# Patient Record
Sex: FEMALE | Race: WHITE | NOT HISPANIC OR LATINO | Employment: UNEMPLOYED | URBAN - METROPOLITAN AREA
[De-identification: names, ages, dates, MRNs, and addresses within clinical notes are randomized per-mention and may not be internally consistent; named-entity substitution may affect disease eponyms.]

---

## 2022-05-20 ENCOUNTER — TELEPHONE (OUTPATIENT)
Dept: URGENT CARE | Facility: CLINIC | Age: 52
End: 2022-05-20

## 2024-07-25 ENCOUNTER — HOSPITAL ENCOUNTER (EMERGENCY)
Facility: HOSPITAL | Age: 54
Discharge: HOME | End: 2024-07-25
Attending: EMERGENCY MEDICINE
Payer: COMMERCIAL

## 2024-07-25 ENCOUNTER — APPOINTMENT (OUTPATIENT)
Dept: CT IMAGING | Facility: HOSPITAL | Age: 54
End: 2024-07-25
Payer: COMMERCIAL

## 2024-07-25 VITALS
RESPIRATION RATE: 16 BRPM | HEART RATE: 57 BPM | OXYGEN SATURATION: 100 % | TEMPERATURE: 97.6 F | WEIGHT: 175 LBS | DIASTOLIC BLOOD PRESSURE: 68 MMHG | BODY MASS INDEX: 28.12 KG/M2 | SYSTOLIC BLOOD PRESSURE: 114 MMHG | HEIGHT: 66 IN

## 2024-07-25 DIAGNOSIS — M51.36 LUMBAR DEGENERATIVE DISC DISEASE: Primary | ICD-10-CM

## 2024-07-25 LAB
ALBUMIN SERPL-MCNC: 4.2 G/DL (ref 3.5–5.2)
ALBUMIN/GLOB SERPL: 1.6 G/DL
ALP SERPL-CCNC: 90 U/L (ref 39–117)
ALT SERPL W P-5'-P-CCNC: 14 U/L (ref 1–33)
ANION GAP SERPL CALCULATED.3IONS-SCNC: 6.8 MMOL/L (ref 5–15)
AST SERPL-CCNC: 16 U/L (ref 1–32)
BACTERIA UR QL AUTO: ABNORMAL /HPF
BASOPHILS # BLD AUTO: 0.05 10*3/MM3 (ref 0–0.2)
BASOPHILS NFR BLD AUTO: 0.9 % (ref 0–1.5)
BILIRUB SERPL-MCNC: 0.5 MG/DL (ref 0–1.2)
BILIRUB UR QL STRIP: NEGATIVE
BUN SERPL-MCNC: 21 MG/DL (ref 6–20)
BUN/CREAT SERPL: 24.7 (ref 7–25)
CALCIUM SPEC-SCNC: 9.2 MG/DL (ref 8.6–10.5)
CHLORIDE SERPL-SCNC: 106 MMOL/L (ref 98–107)
CLARITY UR: CLEAR
CO2 SERPL-SCNC: 27.2 MMOL/L (ref 22–29)
COLOR UR: YELLOW
CREAT SERPL-MCNC: 0.85 MG/DL (ref 0.57–1)
DEPRECATED RDW RBC AUTO: 43.3 FL (ref 37–54)
EGFRCR SERPLBLD CKD-EPI 2021: 80.5 ML/MIN/1.73
EOSINOPHIL # BLD AUTO: 0.17 10*3/MM3 (ref 0–0.4)
EOSINOPHIL NFR BLD AUTO: 3.2 % (ref 0.3–6.2)
ERYTHROCYTE [DISTWIDTH] IN BLOOD BY AUTOMATED COUNT: 12.7 % (ref 12.3–15.4)
GLOBULIN UR ELPH-MCNC: 2.6 GM/DL
GLUCOSE SERPL-MCNC: 98 MG/DL (ref 65–99)
GLUCOSE UR STRIP-MCNC: NEGATIVE MG/DL
HCT VFR BLD AUTO: 39 % (ref 34–46.6)
HGB BLD-MCNC: 12.5 G/DL (ref 12–15.9)
HGB UR QL STRIP.AUTO: ABNORMAL
HOLD SPECIMEN: NORMAL
HYALINE CASTS UR QL AUTO: ABNORMAL /LPF
IMM GRANULOCYTES # BLD AUTO: 0.01 10*3/MM3 (ref 0–0.05)
IMM GRANULOCYTES NFR BLD AUTO: 0.2 % (ref 0–0.5)
KETONES UR QL STRIP: NEGATIVE
LEUKOCYTE ESTERASE UR QL STRIP.AUTO: NEGATIVE
LYMPHOCYTES # BLD AUTO: 1.63 10*3/MM3 (ref 0.7–3.1)
LYMPHOCYTES NFR BLD AUTO: 30.4 % (ref 19.6–45.3)
MAGNESIUM SERPL-MCNC: 2.2 MG/DL (ref 1.6–2.6)
MCH RBC QN AUTO: 29.8 PG (ref 26.6–33)
MCHC RBC AUTO-ENTMCNC: 32.1 G/DL (ref 31.5–35.7)
MCV RBC AUTO: 92.9 FL (ref 79–97)
MONOCYTES # BLD AUTO: 0.43 10*3/MM3 (ref 0.1–0.9)
MONOCYTES NFR BLD AUTO: 8 % (ref 5–12)
NEUTROPHILS NFR BLD AUTO: 3.07 10*3/MM3 (ref 1.7–7)
NEUTROPHILS NFR BLD AUTO: 57.3 % (ref 42.7–76)
NITRITE UR QL STRIP: NEGATIVE
PH UR STRIP.AUTO: 6 [PH] (ref 5–8)
PLATELET # BLD AUTO: 206 10*3/MM3 (ref 140–450)
PMV BLD AUTO: 9.2 FL (ref 6–12)
POTASSIUM SERPL-SCNC: 3.8 MMOL/L (ref 3.5–5.2)
PROT SERPL-MCNC: 6.8 G/DL (ref 6–8.5)
PROT UR QL STRIP: NEGATIVE
RBC # BLD AUTO: 4.2 10*6/MM3 (ref 3.77–5.28)
RBC # UR STRIP: ABNORMAL /HPF
REF LAB TEST METHOD: ABNORMAL
SODIUM SERPL-SCNC: 140 MMOL/L (ref 136–145)
SP GR UR STRIP: 1.01 (ref 1–1.03)
SQUAMOUS #/AREA URNS HPF: ABNORMAL /HPF
UROBILINOGEN UR QL STRIP: ABNORMAL
WBC # UR STRIP: ABNORMAL /HPF
WBC NRBC COR # BLD AUTO: 5.36 10*3/MM3 (ref 3.4–10.8)

## 2024-07-25 PROCEDURE — 99284 EMERGENCY DEPT VISIT MOD MDM: CPT

## 2024-07-25 PROCEDURE — 96375 TX/PRO/DX INJ NEW DRUG ADDON: CPT

## 2024-07-25 PROCEDURE — 96374 THER/PROPH/DIAG INJ IV PUSH: CPT

## 2024-07-25 PROCEDURE — 85025 COMPLETE CBC W/AUTO DIFF WBC: CPT | Performed by: EMERGENCY MEDICINE

## 2024-07-25 PROCEDURE — 96361 HYDRATE IV INFUSION ADD-ON: CPT

## 2024-07-25 PROCEDURE — 99284 EMERGENCY DEPT VISIT MOD MDM: CPT | Performed by: EMERGENCY MEDICINE

## 2024-07-25 PROCEDURE — 83735 ASSAY OF MAGNESIUM: CPT | Performed by: EMERGENCY MEDICINE

## 2024-07-25 PROCEDURE — 80053 COMPREHEN METABOLIC PANEL: CPT | Performed by: EMERGENCY MEDICINE

## 2024-07-25 PROCEDURE — 25810000003 SODIUM CHLORIDE 0.9 % SOLUTION: Performed by: EMERGENCY MEDICINE

## 2024-07-25 PROCEDURE — 25010000002 ONDANSETRON PER 1 MG: Performed by: EMERGENCY MEDICINE

## 2024-07-25 PROCEDURE — 81001 URINALYSIS AUTO W/SCOPE: CPT | Performed by: EMERGENCY MEDICINE

## 2024-07-25 PROCEDURE — 25010000002 KETOROLAC TROMETHAMINE PER 15 MG: Performed by: EMERGENCY MEDICINE

## 2024-07-25 PROCEDURE — 25010000002 METHYLPREDNISOLONE PER 125 MG: Performed by: EMERGENCY MEDICINE

## 2024-07-25 PROCEDURE — 74176 CT ABD & PELVIS W/O CONTRAST: CPT

## 2024-07-25 RX ORDER — KETOROLAC TROMETHAMINE 30 MG/ML
30 INJECTION, SOLUTION INTRAMUSCULAR; INTRAVENOUS ONCE
Status: COMPLETED | OUTPATIENT
Start: 2024-07-25 | End: 2024-07-25

## 2024-07-25 RX ORDER — PREDNISONE 20 MG/1
TABLET ORAL
Qty: 12 TABLET | Refills: 0 | Status: SHIPPED | OUTPATIENT
Start: 2024-07-25

## 2024-07-25 RX ORDER — ONDANSETRON 2 MG/ML
4 INJECTION INTRAMUSCULAR; INTRAVENOUS ONCE
Status: COMPLETED | OUTPATIENT
Start: 2024-07-25 | End: 2024-07-25

## 2024-07-25 RX ORDER — CYCLOBENZAPRINE HCL 10 MG
10 TABLET ORAL 3 TIMES DAILY PRN
Qty: 21 TABLET | Refills: 0 | Status: SHIPPED | OUTPATIENT
Start: 2024-07-25 | End: 2024-08-01

## 2024-07-25 RX ORDER — HYDROCODONE BITARTRATE AND ACETAMINOPHEN 5; 325 MG/1; MG/1
1 TABLET ORAL EVERY 6 HOURS PRN
Qty: 12 TABLET | Refills: 0 | Status: SHIPPED | OUTPATIENT
Start: 2024-07-25 | End: 2024-07-28

## 2024-07-25 RX ORDER — METHYLPREDNISOLONE SODIUM SUCCINATE 125 MG/2ML
125 INJECTION, POWDER, LYOPHILIZED, FOR SOLUTION INTRAMUSCULAR; INTRAVENOUS ONCE
Status: COMPLETED | OUTPATIENT
Start: 2024-07-25 | End: 2024-07-25

## 2024-07-25 RX ADMIN — ONDANSETRON 4 MG: 2 INJECTION INTRAMUSCULAR; INTRAVENOUS at 09:15

## 2024-07-25 RX ADMIN — KETOROLAC TROMETHAMINE 30 MG: 30 INJECTION, SOLUTION INTRAMUSCULAR at 09:15

## 2024-07-25 RX ADMIN — METHYLPREDNISOLONE SODIUM SUCCINATE 125 MG: 125 INJECTION INTRAMUSCULAR; INTRAVENOUS at 10:44

## 2024-07-25 RX ADMIN — SODIUM CHLORIDE 500 ML: 9 INJECTION, SOLUTION INTRAVENOUS at 09:14

## 2024-07-25 NOTE — DISCHARGE INSTRUCTIONS
Today on the CAT scan, we do not see any evidence of a kidney stone or any other obstructive uropathy.  Likewise the remainder of the abdomen and pelvis is normal.  On your lumbosacral spine however you do have degenerative disc disease.  There is narrowing at the right lateral recess of L5-S1 which does appear to contact of the right S1 nerve root.  This does supply the upper leg area and may very well be the cause of your discomfort.    I did send in a prescription for some pain control and muscle relaxer, and a short course of steroids.    I would like you to touch base with your primary care within 7 to 10 days.  If you have persistent pain we may need to consider an MRI of your lumbosacral spine to better assess this area    You likewise did have trace blood on the urine sample.  I would like you to repeat a urine sample in 1 to 2 weeks to make sure the blood has completely cleared    Return for any worsening difficulties    Please read all of the instructions in this handout.  If you receive prescriptions please fill them and take them as directed.  Please call your primary care physician for follow-up appointment in the next 5 to 7 days.  If you do not have a physician you may call the [facility] referral line at [phone].    You may return to the emergency department at any time for any concerns such as worsening symptoms.  If you received a work or school note it will be printed at the back of this packet.

## 2024-07-25 NOTE — FSED PROVIDER NOTE
EMERGENCY DEPARTMENT ENCOUNTER    Room Number:  01/01  Date seen:  7/25/2024  Time seen: 08:53 EDT  PCP: Provider, No Known  Historian:     Discussed/obtained information from independent historians:     HPI:    A complete HPI/ROS/PMH/PSH/SH/FH are unobtainable due to:       History:  Patient is a 54-year-old female.  She presents with a 2-day history of intermittent pain from the right flank into the right lower quadrant.  The pain severely worsened this morning.  It is quite sharp in nature.  No nausea vomiting.  No guy hematuria.  She does report that she was treated for UTI approximately 2 weeks ago.  She states that she did have similar discomfort approximately 1.5 years ago.  She went to the hospital and had complete relief of the discomfort after urinating.  She reports that ultimately the CAT scan did not reveal a ureteral stone.  No other abdominal surgeries    External (non-ED) record review:        Chronic or social conditions impacting care:    ALLERGIES  Sulfa antibiotics    PAST MEDICAL HISTORY  Active Ambulatory Problems     Diagnosis Date Noted    No Active Ambulatory Problems     Resolved Ambulatory Problems     Diagnosis Date Noted    No Resolved Ambulatory Problems     No Additional Past Medical History       PAST SURGICAL HISTORY  History reviewed. No pertinent surgical history.    FAMILY HISTORY  History reviewed. No pertinent family history.    SOCIAL HISTORY  Social History     Socioeconomic History    Marital status:    Tobacco Use    Smoking status: Never   Substance and Sexual Activity    Alcohol use: Not Currently       REVIEW OF SYSTEMS  Review of Systems    All systems reviewed and negative except for those discussed in HPI.       PHYSICAL EXAM    I have reviewed the triage vital signs and nursing notes.  Vitals:    07/25/24 0840   BP: 114/68   Pulse: 57   Resp: 16   Temp: 97.6 °F (36.4 °C)   SpO2: 100%     Physical Exam  Vital signs: Reviewed in nurses notes    General:  Patient is awake alert.  She does appear to be uncomfortable    HEENT: Pupils equal round responsive to light.  Extra-ocular movements are intact.  No scleral icterus.  Nasopharynx is clear.  Oropharynx is clear with moist mucous membranes.  No masses noted    Neck:   Supple without lymphadenopathy    Respiratory:   Nonlabored respirations.  Clear to auscultation bilaterally.  Equal breath sounds bilaterally.  No wheezes or stridor noted.    Cardiovascular: Regular rate and rhythm.  No murmur.  Equal pulses in bilateral lower extremities without edema.    Abdomen: Very soft nondistended.  Mild tenderness right adnexal and right lower quadrant.  No guarding or rebound.    Back: Mild right CVA tenderness.  There is also some point tenderness in the midline in the L3-4-5 area as well as in the right paraspinal region in the same 345 area    Skin:   Warm and dry.  No rashes noted    Neurological examination: Patient is awake alert oriented x4.  Speech is normal.  No facial palsy.  No focal motor or sensory deficits.  LAB RESULTS  Recent Results (from the past 24 hour(s))   Urinalysis With Culture If Indicated - Urine, Clean Catch    Collection Time: 07/25/24  8:43 AM    Specimen: Urine, Clean Catch   Result Value Ref Range    Color, UA Yellow Yellow, Straw    Appearance, UA Clear Clear    pH, UA 6.0 5.0 - 8.0    Specific Gravity, UA 1.015 1.005 - 1.030    Glucose, UA Negative Negative    Ketones, UA Negative Negative    Bilirubin, UA Negative Negative    Blood, UA Small (1+) (A) Negative    Protein, UA Negative Negative    Leuk Esterase, UA Negative Negative    Nitrite, UA Negative Negative    Urobilinogen, UA 0.2 E.U./dL 0.2 - 1.0 E.U./dL   Comprehensive Metabolic Panel    Collection Time: 07/25/24  9:14 AM    Specimen: Blood   Result Value Ref Range    Glucose 98 65 - 99 mg/dL    BUN 21 (H) 6 - 20 mg/dL    Creatinine 0.85 0.57 - 1.00 mg/dL    Sodium 140 136 - 145 mmol/L    Potassium 3.8 3.5 - 5.2 mmol/L    Chloride  106 98 - 107 mmol/L    CO2 27.2 22.0 - 29.0 mmol/L    Calcium 9.2 8.6 - 10.5 mg/dL    Total Protein 6.8 6.0 - 8.5 g/dL    Albumin 4.2 3.5 - 5.2 g/dL    ALT (SGPT) 14 1 - 33 U/L    AST (SGOT) 16 1 - 32 U/L    Alkaline Phosphatase 90 39 - 117 U/L    Total Bilirubin 0.5 0.0 - 1.2 mg/dL    Globulin 2.6 gm/dL    A/G Ratio 1.6 g/dL    BUN/Creatinine Ratio 24.7 7.0 - 25.0    Anion Gap 6.8 5.0 - 15.0 mmol/L    eGFR 80.5 >60.0 mL/min/1.73   Magnesium    Collection Time: 07/25/24  9:14 AM    Specimen: Blood   Result Value Ref Range    Magnesium 2.2 1.6 - 2.6 mg/dL   CBC Auto Differential    Collection Time: 07/25/24  9:14 AM    Specimen: Blood   Result Value Ref Range    WBC 5.36 3.40 - 10.80 10*3/mm3    RBC 4.20 3.77 - 5.28 10*6/mm3    Hemoglobin 12.5 12.0 - 15.9 g/dL    Hematocrit 39.0 34.0 - 46.6 %    MCV 92.9 79.0 - 97.0 fL    MCH 29.8 26.6 - 33.0 pg    MCHC 32.1 31.5 - 35.7 g/dL    RDW 12.7 12.3 - 15.4 %    RDW-SD 43.3 37.0 - 54.0 fl    MPV 9.2 6.0 - 12.0 fL    Platelets 206 140 - 450 10*3/mm3    Neutrophil % 57.3 42.7 - 76.0 %    Lymphocyte % 30.4 19.6 - 45.3 %    Monocyte % 8.0 5.0 - 12.0 %    Eosinophil % 3.2 0.3 - 6.2 %    Basophil % 0.9 0.0 - 1.5 %    Immature Grans % 0.2 0.0 - 0.5 %    Neutrophils, Absolute 3.07 1.70 - 7.00 10*3/mm3    Lymphocytes, Absolute 1.63 0.70 - 3.10 10*3/mm3    Monocytes, Absolute 0.43 0.10 - 0.90 10*3/mm3    Eosinophils, Absolute 0.17 0.00 - 0.40 10*3/mm3    Basophils, Absolute 0.05 0.00 - 0.20 10*3/mm3    Immature Grans, Absolute 0.01 0.00 - 0.05 10*3/mm3       Ordered the above labs and independently interpreted results.  My findings will be discussed in the ED course or medical decision making section below      PROCEDURES:  Procedures      RADIOLOGY RESULTS  CT Abdomen Pelvis Stone Protocol    Result Date: 7/25/2024  CT ABDOMEN PELVIS STONE PROTOCOL-  HISTORY: 54 years of age, Female. Right flank pain.  TECHNIQUE:  CT includes axial imaging from the lung bases to the trochanters  without intravenous contrast and without use of oral contrast. Data reconstructed in coronal and sagittal planes. Radiation dose reduction techniques were utilized, including automated exposure control and exposure modulation based on body size.  COMPARISON: None.  FINDINGS: Lung bases, liver, spleen, adrenal glands, pancreas, gallbladder exhibit normal noncontrasted CT appearance.  No renal or ureteral stone or hydronephrosis. Urinary bladder partially decompressed.  No bowel dilatation or evidence for bowel obstruction. No evidence for appendicitis. No ascites. Degenerative disc disease at L5-S1 where there is disc space narrowing and there is disc osteophyte complex eccentric to the right near the right lateral recess and potentially contacting the traversing right S1 nerve.      1. No renal stone or evidence for obstructive uropathy or acute abnormality of the abdomen/pelvis. 2. At L5-S1, there is degenerative disc disease and there is disc/osteophyte complex eccentric to the right narrowing the right lateral recess, potentially contacting the traversing right S1 nerve.  Radiation dose reduction techniques were utilized, including automated exposure control and exposure modulation based on body size.   This report was finalized on 7/25/2024 9:39 AM by Dr. Sergey العراقي M.D on Workstation: [id number]        Ordered the above noted radiological studies.  Independently interpreted by me.  My findings will be discussed in the medical decision section below.     PROGRESS, DATA ANALYSIS, CONSULTS AND MEDICAL DECISION MAKING    Please note that this section constitutes my independent interpretation of clinical data including lab results, radiology, EKG's.  This constitutes my independent professional opinion regarding differential diagnosis and management of this patient.  It may include any factors such as history from outside sources, review of external records, social determinants of health, management of  medications, response to those treatments, and discussions with other providers.       Orders placed during this visit:  Orders Placed This Encounter   Procedures    CT Abdomen Pelvis Stone Protocol    Urinalysis With Culture If Indicated - Urine, Clean Catch    Urinalysis, Microscopic Only - Urine, Clean Catch    [facility] Urine Culture Tube -    Comprehensive Metabolic Panel    Magnesium    CBC Auto Differential    CBC & Differential    ED Acknowledgement Form Needed;       Medications   sodium chloride 0.9 % bolus 500 mL (0 mL Intravenous Stopped 7/25/24 1044)   ketorolac (TORADOL) injection 30 mg (30 mg Intravenous Given 7/25/24 0915)   ondansetron (ZOFRAN) injection 4 mg (4 mg Intravenous Given 7/25/24 0915)   methylPREDNISolone sodium succinate (SOLU-Medrol) injection 125 mg (125 mg Intravenous Given 7/25/24 1044)         Repeat exam prior to discharge: I did reexamine the patient.  Again she is tender in the right lower back and paraspinal region.  Her abdominal CAT scan is clear.  I suspect her pain is a radiculopathy and we did discuss appropriate follow-up and treatment plan.       Medical Decision Making  Problems Addressed:  Lumbar degenerative disc disease: complicated acute illness or injury    Amount and/or Complexity of Data Reviewed  Labs: ordered.  Radiology: ordered.    Risk  Prescription drug management.            DIAGNOSIS  Final diagnoses:   Lumbar degenerative disc disease          Medication List        New Prescriptions      cyclobenzaprine 10 MG tablet  Commonly known as: FLEXERIL  Take 1 tablet by mouth 3 (Three) Times a Day As Needed for Muscle Spasms for up to 7 days.     HYDROcodone-acetaminophen 5-325 MG per tablet  Commonly known as: NORCO  Take 1 tablet by mouth Every 6 (Six) Hours As Needed for Moderate Pain for up to 3 days.     predniSONE 20 MG tablet  Commonly known as: DELTASONE  3 po daily x 2d, then 2 po daily x 2d, then 1 po daily x 2d.               Where to Get Your  Medications        These medications were sent to [facility] [id number] - [place], [place] - [address] AT [facility] - [phone]  - [phone]   [address] [address]      Phone: [phone]   cyclobenzaprine 10 MG tablet  HYDROcodone-acetaminophen 5-325 MG per tablet  predniSONE 20 MG tablet         FOLLOW-UP  No follow-up provider specified.      Latest Documented Vital Signs:  As of 10:55 EDT  BP- 114/68 HR- 57 Temp- 97.6 °F (36.4 °C) (Oral) O2 sat- 100%    Appropriate PPE utilized throughout this patient encounter to include mask, if indicated, per current protocol. Hand hygiene was performed before donning PPE and after removal when leaving the room.    Please note that portions of this were completed with a voice recognition program.     Note Disclaimer: At [facility], we believe that sharing information builds trust and better relationships. You are receiving this note because you are receiving care at [facility] or recently visited. It is possible you will see health information before a provider has talked with you about it. This kind of information can be easy to misunderstand. To help you fully understand what it means for your health, we urge you to discuss this note with your provider.

## 2024-12-05 ENCOUNTER — TELEPHONE (OUTPATIENT)
Dept: FAMILY MEDICINE CLINIC | Facility: CLINIC | Age: 54
End: 2024-12-05
Payer: COMMERCIAL

## 2024-12-05 NOTE — TELEPHONE ENCOUNTER
Caller: Leidy Lindquist    Relationship: Self    Best call back number: [phone]     What was the call regarding: PATIENT CALLING WANTING TO KNOW IF  WOULD BE WILLING TO TAKE HER ON AS A PATIENT SHE STATED THAT HER  IS A CURRENT PATIENT JONATHAN LINDQUIST. SHE STATED THAT SHE WAS DIAGNOSED WITH DISC DEGENERATIVE DISEASE EARLY ON IN THE YEAR SHE WOULD LIKE TO BE REFERRED TO DR.STEVEN LE FOR FURTHER EVALUATION.

## 2025-01-02 ENCOUNTER — OFFICE VISIT (OUTPATIENT)
Dept: FAMILY MEDICINE CLINIC | Facility: CLINIC | Age: 55
End: 2025-01-02
Payer: COMMERCIAL

## 2025-01-02 VITALS
BODY MASS INDEX: 28.19 KG/M2 | WEIGHT: 175.4 LBS | SYSTOLIC BLOOD PRESSURE: 118 MMHG | DIASTOLIC BLOOD PRESSURE: 70 MMHG | OXYGEN SATURATION: 98 % | HEART RATE: 69 BPM | TEMPERATURE: 97.9 F | RESPIRATION RATE: 16 BRPM | HEIGHT: 66 IN

## 2025-01-02 DIAGNOSIS — M51.362 DEGENERATION OF INTERVERTEBRAL DISC OF LUMBAR REGION WITH DISCOGENIC BACK PAIN AND LOWER EXTREMITY PAIN: Primary | ICD-10-CM

## 2025-01-02 DIAGNOSIS — Z12.11 SCREEN FOR COLON CANCER: ICD-10-CM

## 2025-01-02 DIAGNOSIS — Z13.6 SCREENING FOR CARDIOVASCULAR CONDITION: ICD-10-CM

## 2025-01-02 DIAGNOSIS — R53.83 OTHER FATIGUE: ICD-10-CM

## 2025-01-02 DIAGNOSIS — Z83.49 FAMILY HISTORY OF MTHFR DEFICIENCY: ICD-10-CM

## 2025-01-02 DIAGNOSIS — E55.9 VITAMIN D DEFICIENCY: ICD-10-CM

## 2025-01-02 PROCEDURE — 99214 OFFICE O/P EST MOD 30 MIN: CPT | Performed by: FAMILY MEDICINE

## 2025-01-02 NOTE — PROGRESS NOTES
"Chief Complaint  Establish Care    Subjective        Leidy Rojas presents to [facility]  History of Present Illness  New pt here to get established.  She has not seen any doctor in several years.  She does have some low back pain and DDD.  She has had CT scan and showed L5-S1 issues.  With some sciatica down both legs.  No urine incontinence.  Possible slight stool incontinence 2 x over past couple of weeks. Small amount of yellow liquid.    She does have fatigue as well  Vit d deficiency- no med.    Objective   Vital Signs:  /70 (BP Location: Right arm, Patient Position: Sitting)   Pulse 69   Temp 97.9 °F (36.6 °C)   Resp 16   Ht 167.6 cm (66\")   Wt 79.6 kg (175 lb 6.4 oz)   SpO2 98%   BMI 28.31 kg/m²   Estimated body mass index is 28.31 kg/m² as calculated from the following:    Height as of this encounter: 167.6 cm (66\").    Weight as of this encounter: 79.6 kg (175 lb 6.4 oz).            Physical Exam  Vitals and nursing note reviewed.   Constitutional:       Appearance: Normal appearance. She is well-developed.   Cardiovascular:      Rate and Rhythm: Normal rate and regular rhythm.      Heart sounds: Normal heart sounds. No murmur heard.  Pulmonary:      Effort: Pulmonary effort is normal. No respiratory distress.      Breath sounds: Normal breath sounds. No stridor. No wheezing or rhonchi.   Neurological:      General: No focal deficit present.      Mental Status: She is alert and oriented to person, place, and time. She is not disoriented.   Psychiatric:         Mood and Affect: Mood normal.         Behavior: Behavior normal.        Result Review :                Assessment and Plan   Diagnoses and all orders for this visit:    1. Degeneration of intervertebral disc of lumbar region with discogenic back pain and lower extremity pain (Primary)  -     Cancel: MRI Lumbar Spine Without Contrast  -     Ambulatory Referral to Spine Surgery  -     MRI Lumbar Spine Without " Contrast    2. Screen for colon cancer  -     Cologuard - Stool, Per Rectum; Future    3. Other fatigue  -     Cancel: CBC & Differential  -     Cancel: TSH Rfx On Abnormal To Free T4  -     Cancel: Vitamin B12  -     CBC & Differential  -     TSH Rfx On Abnormal To Free T4  -     Vitamin B12    4. Screening for cardiovascular condition  -     Cancel: Comprehensive Metabolic Panel  -     Cancel: Lipid Panel  -     Comprehensive Metabolic Panel  -     Lipid Panel    5. Vitamin D deficiency  -     Cancel: Vitamin D,25-Hydroxy  -     Vitamin D,25-Hydroxy  -     Folate    6. Family history of MTHFR deficiency  -     MTHFR Mutation             Follow Up   No follow-ups on file.  Patient was given instructions and counseling regarding her condition or for health maintenance advice. Please see specific information pulled into the AVS if appropriate.       Will get cologard scheduled.    Will get mri and spine specialist.    She will think about mammogram.

## 2025-01-07 DIAGNOSIS — E55.9 VITAMIN D DEFICIENCY: Primary | ICD-10-CM

## 2025-01-07 PROBLEM — E78.2 MIXED HYPERLIPIDEMIA: Status: ACTIVE | Noted: 2025-01-07

## 2025-01-07 LAB
25(OH)D3+25(OH)D2 SERPL-MCNC: 25.8 NG/ML (ref 30–100)
ALBUMIN SERPL-MCNC: 4.2 G/DL (ref 3.8–4.9)
ALP SERPL-CCNC: 98 IU/L (ref 44–121)
ALT SERPL-CCNC: 13 IU/L (ref 0–32)
AST SERPL-CCNC: 16 IU/L (ref 0–40)
BASOPHILS # BLD AUTO: 0.1 X10E3/UL (ref 0–0.2)
BASOPHILS NFR BLD AUTO: 1 %
BILIRUB SERPL-MCNC: 0.6 MG/DL (ref 0–1.2)
BUN SERPL-MCNC: 13 MG/DL (ref 6–24)
BUN/CREAT SERPL: 14 (ref 9–23)
CALCIUM SERPL-MCNC: 9.2 MG/DL (ref 8.7–10.2)
CHLORIDE SERPL-SCNC: 107 MMOL/L (ref 96–106)
CHOLEST SERPL-MCNC: 228 MG/DL (ref 100–199)
CO2 SERPL-SCNC: 24 MMOL/L (ref 20–29)
CREAT SERPL-MCNC: 0.93 MG/DL (ref 0.57–1)
EGFRCR SERPLBLD CKD-EPI 2021: 72 ML/MIN/1.73
EOSINOPHIL # BLD AUTO: 0.3 X10E3/UL (ref 0–0.4)
EOSINOPHIL NFR BLD AUTO: 6 %
ERYTHROCYTE [DISTWIDTH] IN BLOOD BY AUTOMATED COUNT: 12.5 % (ref 11.7–15.4)
FOLATE SERPL-MCNC: 11.4 NG/ML
GLOBULIN SER CALC-MCNC: 2.4 G/DL (ref 1.5–4.5)
GLUCOSE SERPL-MCNC: 88 MG/DL (ref 70–99)
HCT VFR BLD AUTO: 39.4 % (ref 34–46.6)
HDLC SERPL-MCNC: 65 MG/DL
HGB BLD-MCNC: 13 G/DL (ref 11.1–15.9)
IMM GRANULOCYTES # BLD AUTO: 0 X10E3/UL (ref 0–0.1)
IMM GRANULOCYTES NFR BLD AUTO: 0 %
IMP & REVIEW OF LAB RESULTS: NORMAL
LDLC SERPL CALC-MCNC: 144 MG/DL (ref 0–99)
LYMPHOCYTES # BLD AUTO: 1.4 X10E3/UL (ref 0.7–3.1)
LYMPHOCYTES NFR BLD AUTO: 28 %
MCH RBC QN AUTO: 30.4 PG (ref 26.6–33)
MCHC RBC AUTO-ENTMCNC: 33 G/DL (ref 31.5–35.7)
MCV RBC AUTO: 92 FL (ref 79–97)
MONOCYTES # BLD AUTO: 0.4 X10E3/UL (ref 0.1–0.9)
MONOCYTES NFR BLD AUTO: 9 %
MTHFR GENE MUT ANL BLD/T: NORMAL
NEUTROPHILS # BLD AUTO: 2.7 X10E3/UL (ref 1.4–7)
NEUTROPHILS NFR BLD AUTO: 56 %
PLATELET # BLD AUTO: 229 X10E3/UL (ref 150–450)
POTASSIUM SERPL-SCNC: 4.2 MMOL/L (ref 3.5–5.2)
PROT SERPL-MCNC: 6.6 G/DL (ref 6–8.5)
RBC # BLD AUTO: 4.27 X10E6/UL (ref 3.77–5.28)
SODIUM SERPL-SCNC: 143 MMOL/L (ref 134–144)
TRIGL SERPL-MCNC: 106 MG/DL (ref 0–149)
TSH SERPL DL<=0.005 MIU/L-ACNC: 1.18 UIU/ML (ref 0.45–4.5)
VIT B12 SERPL-MCNC: 395 PG/ML (ref 232–1245)
VLDLC SERPL CALC-MCNC: 19 MG/DL (ref 5–40)
WBC # BLD AUTO: 4.8 X10E3/UL (ref 3.4–10.8)

## 2025-01-07 RX ORDER — ERGOCALCIFEROL 1.25 MG/1
50000 CAPSULE, LIQUID FILLED ORAL WEEKLY
Qty: 5 CAPSULE | Refills: 5 | Status: SHIPPED | OUTPATIENT
Start: 2025-01-07

## 2025-01-09 ENCOUNTER — PATIENT ROUNDING (BHMG ONLY) (OUTPATIENT)
Dept: FAMILY MEDICINE CLINIC | Facility: CLINIC | Age: 55
End: 2025-01-09
Payer: COMMERCIAL

## 2025-01-09 NOTE — PROGRESS NOTES
A My-Chart message has been sent to the patient for PATIENT ROUNDING with [facility]

## 2025-01-10 ENCOUNTER — TELEPHONE (OUTPATIENT)
Dept: FAMILY MEDICINE CLINIC | Facility: CLINIC | Age: 55
End: 2025-01-10
Payer: COMMERCIAL

## 2025-01-10 NOTE — TELEPHONE ENCOUNTER
Ann from the hospital called and is wanting to know if they can reschedule the MRI that is on Monday 01/13.  Her insurance requires a deposit and they have not been able to get a hold of her.  Please call [phone] to advise.

## 2025-01-13 ENCOUNTER — HOSPITAL ENCOUNTER (OUTPATIENT)
Facility: HOSPITAL | Age: 55
Discharge: HOME | End: 2025-01-13
Admitting: FAMILY MEDICINE
Payer: COMMERCIAL

## 2025-01-13 PROCEDURE — 72148 MRI LUMBAR SPINE W/O DYE: CPT

## 2025-03-11 ENCOUNTER — OFFICE VISIT (OUTPATIENT)
Dept: NEUROSURGERY | Facility: CLINIC | Age: 55
End: 2025-03-11
Payer: COMMERCIAL

## 2025-03-11 DIAGNOSIS — M54.50 ACUTE RIGHT-SIDED LOW BACK PAIN WITHOUT SCIATICA: Primary | ICD-10-CM

## 2025-03-11 PROCEDURE — 99203 OFFICE O/P NEW LOW 30 MIN: CPT | Performed by: NEUROLOGICAL SURGERY

## 2025-03-11 NOTE — PROGRESS NOTES
Subjective   Patient ID: Leidy Rojas is a 55 y.o. female is being seen for consultation today at the request of Alisha Arvizu MD for degeneration of intervertebral disc of lumbar region. Patient had a MRI L-spine done on 1/13/2025    Treatment: No treatment     Today patient states that she has intermittent low back pain along with B/L leg pain. Patient states that in the mornings her legs feel heavy     History of Present Illness    This patient had a episode in July of last year where she has severe pain in her right flank.  She thought she had a kidney stone.  There was no radiation into her legs.  She was treated with steroids and some muscle relaxants and gradually felt better.  She has not had a severe flareup since then but she has had some dull pain on a fairly frequent basis in the right flank.  There is never been any radiation to her legs.  She has done chiropractic and they have given her some physical therapy exercises to do but she has not done formal physical therapy.    The following portions of the patient's history were reviewed and updated as appropriate: allergies, current medications, past family history, past medical history, past social history, past surgical history, and problem list.    Review of Systems   Constitutional:  Negative for chills and fever.   HENT:  Negative for congestion.    Genitourinary:  Negative for difficulty urinating and dysuria.   Musculoskeletal:  Positive for back pain. Negative for gait problem.   Neurological:  Negative for weakness and numbness.         Objective     There were no vitals filed for this visit.  There is no height or weight on file to calculate BMI.    Tobacco Use: Unknown (3/11/2025)    Patient History     Smoking Tobacco Use: Never     Smokeless Tobacco Use: Unknown     Passive Exposure: Not on file          Physical Exam    Constitutional:       Appearance: She is well-developed.   HENT:      Head: Normocephalic and atraumatic.   Eyes:       General: Lids are normal.      Extraocular Movements: Extraocular movements intact.      Conjunctiva/sclera: Conjunctivae normal.      Pupils: Pupils are equal, round, and reactive to light.   Neck:      Vascular: No carotid bruit.   Neurological:      Motor: Motor strength is normal.     Coordination: Coordination is intact.      Deep Tendon Reflexes:      Reflex Scores:       Tricep reflexes are 2+ on the right side and 2+ on the left side.       Bicep reflexes are 2+ on the right side and 2+ on the left side.       Brachioradialis reflexes are 2+ on the right side and 2+ on the left side.       Patellar reflexes are 2+ on the right side and 2+ on the left side.       Achilles reflexes are 2+ on the right side and 2+ on the left side.    Neurological Exam    Mental Status  Awake, alert and oriented to person, place and time. Oriented to person, place and time.    Cranial Nerves  CN II: Visual acuity is normal. Visual fields full to confrontation.  CN III, IV, VI: Extraocular movements intact bilaterally. Normal lids and orbits bilaterally. Pupils equal round and reactive to light bilaterally.  CN V: Facial sensation is normal.  CN VII: Full and symmetric facial movement.  CN IX, X: Palate elevates symmetrically. Normal gag reflex.  CN XI: Shoulder shrug strength is normal.  CN XII: Tongue midline without atrophy or fasciculations.    Motor   Strength is 5/5 throughout all four extremities.    Sensory  Sensation is intact to light touch, pinprick, vibration and proprioception in all four extremities.    Reflexes                                            Right                      Left  Brachioradialis                    2+                         2+  Biceps                                 2+                         2+  Triceps                                2+                         2+  Finger flex                           2+                         2+  Hamstring                            2+                          2+  Patellar                                2+                         2+  Achilles                                2+                         2+    Coordination    Finger-to-nose, rapid alternating movements and heel-to-shin normal bilaterally without dysmetria.    Gait  Normal casual, toe, heel and tandem gait.    Assessment & Plan   Independent Review of Radiographic Studies:      I personally reviewed the images from the following studies.    I reviewed an MRI of the lumbar spine done in January of this year.  This shows a fairly open canal on the sagittal images.  There is some disc bulging at L1-2.  On the axial images T12-L1 is widely open.  L1-2 shows little disc bulging to the left.  L2-3 also shows a little disc bulging.  L3-4 looks okay as does L4-5.  L5-S1 is fairly degenerated with a central and right-sided disc bulge to disc herniation.    Medical Decision Making:      I told the patient and her  that I do not think the disc herniation at L5-S1 is causing the problem.  She had no leg pain and the disc is way lower than where the pain was.  I told her she should talk to her chiropractor about getting a home exercise program.  If they do not do that or it is not helping then she should call and we will get her set up for formal physical therapy.  Otherwise she just needs to be careful with lifting and lose some weight.  I will see her back on an as-needed basis and she is to call if anything flares up.    Diagnoses and all orders for this visit:    1. Acute right-sided low back pain without sciatica (Primary)      Return if symptoms worsen or fail to improve.